# Patient Record
Sex: MALE | Race: WHITE | Employment: FULL TIME | URBAN - METROPOLITAN AREA
[De-identification: names, ages, dates, MRNs, and addresses within clinical notes are randomized per-mention and may not be internally consistent; named-entity substitution may affect disease eponyms.]

---

## 2020-11-16 ENCOUNTER — APPOINTMENT (OUTPATIENT)
Dept: GENERAL RADIOLOGY | Age: 30
End: 2020-11-16
Payer: COMMERCIAL

## 2020-11-16 ENCOUNTER — NURSE TRIAGE (OUTPATIENT)
Dept: OTHER | Facility: CLINIC | Age: 30
End: 2020-11-16

## 2020-11-16 ENCOUNTER — HOSPITAL ENCOUNTER (EMERGENCY)
Age: 30
Discharge: HOME | End: 2020-11-16
Attending: EMERGENCY MEDICINE
Payer: COMMERCIAL

## 2020-11-16 VITALS
OXYGEN SATURATION: 100 % | SYSTOLIC BLOOD PRESSURE: 142 MMHG | WEIGHT: 158 LBS | TEMPERATURE: 98.1 F | HEART RATE: 76 BPM | RESPIRATION RATE: 14 BRPM | HEIGHT: 71 IN | DIASTOLIC BLOOD PRESSURE: 79 MMHG | BODY MASS INDEX: 22.12 KG/M2

## 2020-11-16 LAB
A/G RATIO: 2 (ref 1.1–2.2)
ALBUMIN SERPL-MCNC: 4.8 G/DL (ref 3.4–5)
ALP BLD-CCNC: 71 U/L (ref 40–129)
ALT SERPL-CCNC: 21 U/L (ref 10–40)
ANION GAP SERPL CALCULATED.3IONS-SCNC: 10 MMOL/L (ref 3–16)
AST SERPL-CCNC: 22 U/L (ref 15–37)
BASOPHILS ABSOLUTE: 0 K/UL (ref 0–0.2)
BASOPHILS RELATIVE PERCENT: 0.4 %
BILIRUB SERPL-MCNC: 0.6 MG/DL (ref 0–1)
BUN BLDV-MCNC: 20 MG/DL (ref 7–20)
CALCIUM SERPL-MCNC: 9.7 MG/DL (ref 8.3–10.6)
CHLORIDE BLD-SCNC: 103 MMOL/L (ref 99–110)
CO2: 25 MMOL/L (ref 21–32)
CREAT SERPL-MCNC: 0.6 MG/DL (ref 0.9–1.3)
EOSINOPHILS ABSOLUTE: 0.2 K/UL (ref 0–0.6)
EOSINOPHILS RELATIVE PERCENT: 1.6 %
GFR AFRICAN AMERICAN: >60
GFR NON-AFRICAN AMERICAN: >60
GLOBULIN: 2.4 G/DL
GLUCOSE BLD-MCNC: 88 MG/DL (ref 70–99)
HCT VFR BLD CALC: 45 % (ref 40.5–52.5)
HEMOGLOBIN: 14.7 G/DL (ref 13.5–17.5)
LYMPHOCYTES ABSOLUTE: 3.9 K/UL (ref 1–5.1)
LYMPHOCYTES RELATIVE PERCENT: 35.2 %
MCH RBC QN AUTO: 29.5 PG (ref 26–34)
MCHC RBC AUTO-ENTMCNC: 32.7 G/DL (ref 31–36)
MCV RBC AUTO: 90.3 FL (ref 80–100)
MONOCYTES ABSOLUTE: 1 K/UL (ref 0–1.3)
MONOCYTES RELATIVE PERCENT: 9.5 %
NEUTROPHILS ABSOLUTE: 5.8 K/UL (ref 1.7–7.7)
NEUTROPHILS RELATIVE PERCENT: 53.3 %
PDW BLD-RTO: 13.4 % (ref 12.4–15.4)
PLATELET # BLD: 235 K/UL (ref 135–450)
PMV BLD AUTO: 9 FL (ref 5–10.5)
POTASSIUM SERPL-SCNC: 4.7 MMOL/L (ref 3.5–5.1)
RBC # BLD: 4.99 M/UL (ref 4.2–5.9)
SODIUM BLD-SCNC: 138 MMOL/L (ref 136–145)
TOTAL PROTEIN: 7.2 G/DL (ref 6.4–8.2)
TROPONIN: <0.01 NG/ML
WBC # BLD: 10.9 K/UL (ref 4–11)

## 2020-11-16 PROCEDURE — 85025 COMPLETE CBC W/AUTO DIFF WBC: CPT

## 2020-11-16 PROCEDURE — 99283 EMERGENCY DEPT VISIT LOW MDM: CPT

## 2020-11-16 PROCEDURE — 84484 ASSAY OF TROPONIN QUANT: CPT

## 2020-11-16 PROCEDURE — 93005 ELECTROCARDIOGRAM TRACING: CPT | Performed by: EMERGENCY MEDICINE

## 2020-11-16 PROCEDURE — 71045 X-RAY EXAM CHEST 1 VIEW: CPT

## 2020-11-16 PROCEDURE — 80053 COMPREHEN METABOLIC PANEL: CPT

## 2020-11-16 PROCEDURE — 6370000000 HC RX 637 (ALT 250 FOR IP): Performed by: NURSE PRACTITIONER

## 2020-11-16 RX ORDER — NITROGLYCERIN 0.4 MG/1
0.4 TABLET SUBLINGUAL EVERY 5 MIN PRN
Status: DISCONTINUED | OUTPATIENT
Start: 2020-11-16 | End: 2020-11-16

## 2020-11-16 RX ADMIN — NITROGLYCERIN 0.4 MG: 0.4 TABLET, ORALLY DISINTEGRATING SUBLINGUAL at 19:40

## 2020-11-16 ASSESSMENT — PAIN DESCRIPTION - PAIN TYPE: TYPE: ACUTE PAIN

## 2020-11-16 ASSESSMENT — PAIN DESCRIPTION - LOCATION: LOCATION: CHEST

## 2020-11-16 ASSESSMENT — HEART SCORE: ECG: 1

## 2020-11-16 ASSESSMENT — PAIN SCALES - GENERAL: PAINLEVEL_OUTOF10: 2

## 2020-11-16 NOTE — TELEPHONE ENCOUNTER
Reason for Disposition   Extra heart beats OR irregular heart beating   (i.e., \"palpitations\")    Answer Assessment - Initial Assessment Questions  1. RESPIRATORY STATUS: \"Describe your breathing? \" (e.g., wheezing, shortness of breath, unable to speak, severe coughing)       Short of breath worse with caffeine or when at work     2. ONSET: \"When did this breathing problem begin? \"       Eight days    3. PATTERN \"Does the difficult breathing come and go, or has it been constant since it started? \"       Comes and goes    4. SEVERITY: \"How bad is your breathing? \" (e.g., mild, moderate, severe)     - MILD: No SOB at rest, mild SOB with walking, speaks normally in sentences, can lay down, no retractions, pulse < 100.     - MODERATE: SOB at rest, SOB with minimal exertion and prefers to sit, cannot lie down flat, speaks in phrases, mild retractions, audible wheezing, pulse 100-120.     - SEVERE: Very SOB at rest, speaks in single words, struggling to breathe, sitting hunched forward, retractions, pulse > 120       Mild    5. RECURRENT SYMPTOM: \"Have you had difficulty breathing before? \" If so, ask: \"When was the last time? \" and \"What happened that time? \"       No    6. CARDIAC HISTORY: \"Do you have any history of heart disease? \" (e.g., heart attack, angina, bypass surgery, angioplasty)       No    7. LUNG HISTORY: \"Do you have any history of lung disease? \"  (e.g., pulmonary embolus, asthma, emphysema)      No    8. CAUSE: \"What do you think is causing the breathing problem? \"       Not sure low iron or pt felt may be keto diet and electrolytes    9. OTHER SYMPTOMS: \"Do you have any other symptoms? (e.g., dizziness, runny nose, cough, chest pain, fever)      Dizziness, chest pressure, heart pounding    10. PREGNANCY: \"Is there any chance you are pregnant? \" \"When was your last menstrual period? \"        N/a     11. TRAVEL: \"Have you traveled out of the country in the last month? \" (e.g., travel history, exposures) n/a    Protocols used: BREATHING DIFFICULTY-ADULT-OH    Pt c/o rapid heart rate, sob, chest discomfort and pressure pt to ED now    Attention Provider: Thank you for allowing me to participate in the care of your patient. The patient was connected to triage in response to information provided to the ECC. Please do not respond through this encounter as the response is not directed to a shared pool.

## 2020-11-17 LAB
EKG ATRIAL RATE: 91 BPM
EKG DIAGNOSIS: NORMAL
EKG P AXIS: 70 DEGREES
EKG P-R INTERVAL: 116 MS
EKG Q-T INTERVAL: 350 MS
EKG QRS DURATION: 92 MS
EKG QTC CALCULATION (BAZETT): 430 MS
EKG R AXIS: 65 DEGREES
EKG T AXIS: 44 DEGREES
EKG VENTRICULAR RATE: 91 BPM

## 2020-11-17 PROCEDURE — 93010 ELECTROCARDIOGRAM REPORT: CPT | Performed by: INTERNAL MEDICINE

## 2020-11-17 NOTE — PROGRESS NOTES
[address] [facility]   Cardiovascular Evaluation    PATIENT: Mariely Ramirez  DATE: 2020  MRN: <[id number]>  CSN: [id number]  : 1990      Primary Care Doctor: No primary care provider on file. Reason for evaluation:   Follow-Up from Hospital (2020) and Chest Pain      Subjective:   History of present illness on initial date of evaluation:  Mariely Ramirez is a 38-year-old male with no significant past medical history referred for further evaluation of chest pain, palpitations, and lightheadedness. The patient reports that 2 weeks ago, he became lightheaded while driving and says that his fingers went numb. Since then, he has had recurrent episodes of lightheadedness, typically when standing but has never passed out. During some of these episodes, he feels like his heart is racing. He has also been having chest pressure that seems to come on after drinking caffeine or vaping. Sometimes he will notice the chest pressure and palpitations when exerting himself. When this happens, he sits to calm himself and the pain will usually go away after 10 minutes. He was seen in the ED for these symptoms on 20. At that time, troponin was negative and ECG showed non-specific ST-T changes. CXR showed clear lung fields without any other significant abnormalities. Serum electrolytes were within normal limits and he was ultimately discharged home with instructions to increase his PO fluid intake and follow-up with cardiology. Additionally, the patient states that he has been feeling fatigued lately and has been careful about over-exerting himself because he is worried about his heart racing. He had been running about 5 miles per day, but quit doing this a couple of weeks ago due to his symptoms. He says that he works 90 hours per week and has been under a lot of stress lately trying to cope with the death of a friend. His father was also recently injured.   He had been on a keto diet and had lost 56 lbs. He is no longer doing this and is trying to eat a more well balanced diet. He used to smoke one pack of cigarettes per day but quit 5 years ago and started vaping. He also smokes marijuana 2-3 times per week. Only drinks alcohol on occasion. Family history is notable for heart failure in his father and coronary artery disease in his paternal grandmother who had 4 stents in her 76s. His paternal gradfather also had heart disease, but he is not sure of his diagnoses. There is no problem list on file for this patient. Past Medical History:   has no past medical history on file. Surgical History:   has no past surgical history on file. Social History:   reports that he has quit smoking. He smoked 1.00 pack per day. He has never used smokeless tobacco. He reports current alcohol use. He reports that he does not use drugs. Rarely drinks alcohol. Smokes marijuana 2-3 times a week. Quit smoking 5 years ago and started vaping. Family History:  Father with enlarged heart. Grandmother had CAD with 4 stents in her 66's. Grandfather in his 63's developed cardiac disease (unsure of specific diagnoses.)    Home Medications:  Reviewed and are listed in nursing record. and/or listed below  No current outpatient medications on file. No current facility-administered medications for this visit. Allergies:  Patient has no known allergies. Review of Systems:   A 14 point review of symptoms completed. Pertinent positives identified in the HPI, all other review of symptoms negative as below. Review of Systems   Constitutional: Negative for chills and fever. HENT: Negative for congestion, rhinorrhea and sore throat. Eyes: Negative for photophobia, pain and visual disturbance. Respiratory: Negative for cough and shortness of breath. Cardiovascular: Positive for chest pain and palpitations. Negative for leg swelling.    Gastrointestinal: Negative for abdominal pain, blood in stool, constipation, diarrhea, nausea and vomiting. Endocrine: Negative for cold intolerance and heat intolerance. Genitourinary: Negative for difficulty urinating, dysuria and hematuria. Musculoskeletal: Negative for arthralgias, joint swelling and myalgias. Skin: Negative for rash and wound. Allergic/Immunologic: Negative for environmental allergies and food allergies. Neurological: Positive for dizziness and light-headedness. Negative for syncope. Hematological: Negative for adenopathy. Does not bruise/bleed easily. Psychiatric/Behavioral: Negative for dysphoric mood. The patient is nervous/anxious. Objective:   PHYSICAL EXAM:    Vitals:    11/18/20 1241   BP: (!) 144/82   Pulse: 92   SpO2:     Weight: 163 lb (73.9 kg)     Wt Readings from Last 3 Encounters:   11/18/20 163 lb (73.9 kg)   11/16/20 158 lb (71.7 kg)   01/11/16 210 lb (95.3 kg)     Orthostatics:  Lying:   /78 HR 77  Sitting:  /86 HR 96  Standing:  /82 HR 92    General: Adult male in no acute distress. Pleasant and interactive on exam.  HEENT: Normocephalic, atraumatic, non-icteric, hearing intact, nares normal, mucous membranes moist.  Neck: Supple, trachea midline. No adenopathy. No thyromegaly. No carotid bruits. No JVD. Heart: Regular rate and rhythm. Normal S1 and S2. No murmurs, gallops or rubs. Lungs: Normal respiratory effort. Clear to auscultation bilaterally. No wheezes, rales, or rhonchi. Abdomen: Soft, non-tender. Normoactive bowel sounds. No masses or organomegaly. Skin: No rashes, wounds, or lesions. Pulses: 2+ and symmetric. Extremities: No clubbing, cyanosis, or edema. Musculoskeletal: 5/5 strength in upper and lower extremities. Psych: Normal mood and affect. Neuro: Alert and oriented to person, place, and time. No focal deficits noted.       LABS   CBC:      Lab Results   Component Value Date    WBC 10.9 11/16/2020    RBC 4.99 11/16/2020    HGB 14.7 11/16/2020    HCT 45.0 11/16/2020    MCV 90.3 11/16/2020    RDW 13.4 11/16/2020     11/16/2020     CMP:  Lab Results   Component Value Date     11/16/2020    K 4.7 11/16/2020     11/16/2020    CO2 25 11/16/2020    BUN 20 11/16/2020    CREATININE 0.6 11/16/2020    GFRAA >60 11/16/2020    AGRATIO 2.0 11/16/2020    LABGLOM >60 11/16/2020    GLUCOSE 88 11/16/2020    PROT 7.2 11/16/2020    CALCIUM 9.7 11/16/2020    BILITOT 0.6 11/16/2020    ALKPHOS 71 11/16/2020    AST 22 11/16/2020    ALT 21 11/16/2020     PT/INR:   No results found for: PTINR  Liver:  No components found for: CHLPL  Lab Results   Component Value Date    ALT 21 11/16/2020    AST 22 11/16/2020    ALKPHOS 71 11/16/2020    BILITOT 0.6 11/16/2020     No results found for: LABA1C  Lipids:       No results found for: TRIG       No results found for: HDL       No results found for: LDLCALC       No results found for: LABVLDL      CARDIAC DATA   EKG 11/16/2020:  Normal sinus rhythm, non-specific ST-T wave abnormality. ECHO: N/A    STRESS TEST: N/A    CARDIAC CATH: N/A    VASCULAR/OTHER IMAGING:  Chest X-Ray 11/16/2020:  No acute cardiopulmonary process. Assessment:     1. Atypical chest pain - Will obtain stress echocardiogram to assess cardiac structure and function and evaluate for ischemia. 2. Lightheadedness - May be secondary to panic attacks and/or vasovagal reaction versus orthostatic hypotension (had previously been following strict keto diet and lost 56 lbs). Orthostatic vitals were negative in clinic today. Denies syncope. 3. Palpitations - Arrange for 2-week cardiac event monitor to evaluate for tachyarrhythmias. Electrolytes WNL on recent labs. No recent TSH on record. 4. Mildly elevated BP  5. H/o tobacco use  6. Marijuana use    Plan:     1. Ordered exercise stress echocardiogram to assess cardiac structure and function and evaluate for ischemia.   2. Arrange for 2-week cardiac event monitor to evaluate for possible tachyarrhythmias. 3. Check TSH. 4. Encouraged patient to limit use of caffeine and recommended that he quit vaping. 5. Recommended well balanced diet and adequate PO fluid intake. 6. Advised to always rise slowly from lying and sitting positions and to sit or lie down immediately whenever he gets lightheaded. 7. BP mildly elevated at 144/82 in clinic today. Will monitor and consider starting on antihypertensive therapy if elevated on repeat readings. 8. Follow-up in one month. This note was scribed in the presence of Amadou Dunham DO by Munira Stephens RN. It is a pleasure to assist in the care of Yari Sommers. Please call with any questions. The scribes documentation has been prepared under my direction and personally reviewed by me in its entirety. I confirm that the note above accurately reflects all work, treatment, procedures, and medical decision making performed by me. I, Dr. Lul Rogers, personally performed the services described in this documentation as scribed by Munira Stephens RN in my presence, and it is both accurate and complete to the best of our ability.          Lul Rogers, [address] [facility]  [phone] [facility]  [phone] [facility]

## 2020-11-17 NOTE — ED PROVIDER NOTES
I personally evaluated and examined the patient in conjunction with the ARDHIKA and agree with the assessment, treatment plan and disposition of the patient as recorded by the RADHIKA. I reviewed pertinent nursing notes, triage notes, vital signs, past medical history, family and social history, medications, and allergies. Complete review of systems was conducted by the RADHIKA and/or myself. Review of systems is negative except as documented in the history of present illness. Brief HPI: 27-year-old male presents emergency department chief complaint of a week and a half of chest discomfort lightheadedness and palpitations. Feels lightheadedness when he stands up. Also intermittently develops palpitations with associated chest discomfort described as a pressure lasting a couple of seconds. Usually occurs with exertion. No family history of sudden cardiac death. 3/10. No radiation. No associated shortness of breath. Denies past history of DVT or PE, denies hormone therapy use, denies recent surgery, denies recent travel. No calf pain or lower extremity swelling. Of note, patient recently lost a lot of weight secondary to keto diet, exercise and intermittent fasting. Denies any new supplements but reports also sometimes gets chest pain and palpitations with caffeine intake. No family history of sudden cardiac death. Denies any cocaine use. Physical Exam: General: Patient is in no acute distress   Head: Normocephalic, atraumatic, pupils are equal and reactive to light. EOMI. Neck: Neck is supple. No JVD noted. Cardiovascular: Capillary refill less than 2 seconds  Lungs: No respiratory distress  Abdomen: soft, non-tender, non-distended   Extremities: no lower extremity edema. Capillary refill is less than 2 seconds   Skin: no cyanosis or pallor; no rashes noted   Neuro: CN's 2-12 are grossly intact. No focal neurologic deficit appreciated. Cardiac work-up initiated.   PERC score is negative therefore further work-up not indicated. Heart score is low risk. Delta troponin not indicated as symptom onset is greater than 6 hours. Heart score is a 1. Patient is currently asymptomatic. Given the above symptoms recommend follow-up with cardiology-consider Holter monitor. However, currently patient is asymptomatic young and otherwise healthy with a low risk heart score. Therefore recommend discharge home with strict return precautions. Patient is also educated to increase fluid intake, avoid intermittent fasting and avoid heavy exertion until following up with cardiology. Melchor Carrillo is unremarkable here. HEART Score ? 3 and 1 negative troponin - No hospitalization indicated    I completed a HEART Score to screen for Major Adverse Cardiac Event (MACE) in this patient. The evidence indicates that the patient is very low risk for MACE and this is consistent with my clinical intuition. The risk of further workup or hospitalization for MACE is likely higher than the risk of the patient having a MACE. It is, therefore, in the patients best interest not to do additional emergent testing or to be hospitalized for MACE. Patient instructed to follow up with their primary care doctor in one-two days and return to the emergency department if worsening of the condition or any other concerns. Please see discharge instructions for further delineation regarding the specific discharge instructions explained and given to the patient. EKG: EKG interpretation by ED physician: Normal axis, normal sinus rhythm at a rate of 91 bpm. No ischemic ST changes. Avoid heavy exertion    FINAL IMPRESSION     1. Chest pain, unspecified type    2. Lightheaded    3. Palpitations            Electronically signed by:   Sharon Howard DO  11/16/20 2007

## 2020-11-18 ENCOUNTER — OFFICE VISIT (OUTPATIENT)
Dept: CARDIOLOGY CLINIC | Age: 30
End: 2020-11-18
Payer: COMMERCIAL

## 2020-11-18 ENCOUNTER — TELEPHONE (OUTPATIENT)
Dept: CARDIOLOGY CLINIC | Age: 30
End: 2020-11-18

## 2020-11-18 VITALS
OXYGEN SATURATION: 98 % | SYSTOLIC BLOOD PRESSURE: 144 MMHG | WEIGHT: 163 LBS | BODY MASS INDEX: 22.82 KG/M2 | HEIGHT: 71 IN | DIASTOLIC BLOOD PRESSURE: 82 MMHG | HEART RATE: 92 BPM

## 2020-11-18 PROCEDURE — 99203 OFFICE O/P NEW LOW 30 MIN: CPT | Performed by: INTERNAL MEDICINE

## 2020-11-21 PROBLEM — Z87.891 FORMER TOBACCO USE: Status: ACTIVE | Noted: 2020-11-21

## 2020-11-21 ASSESSMENT — ENCOUNTER SYMPTOMS
SHORTNESS OF BREATH: 0
VOMITING: 0
PHOTOPHOBIA: 0
DIARRHEA: 0
SORE THROAT: 0
RHINORRHEA: 0
COUGH: 0
EYE PAIN: 0
BLOOD IN STOOL: 0
ABDOMINAL PAIN: 0
NAUSEA: 0
CONSTIPATION: 0

## 2020-11-30 ENCOUNTER — TELEPHONE (OUTPATIENT)
Dept: EMERGENCY DEPT | Age: 30
End: 2020-11-30

## 2020-11-30 NOTE — TELEPHONE ENCOUNTER
[facility] ([facility]) reached out to patient to complete brief wellness check. However, the patient did not answer the phone. [facility] will try to reach out to patient again.

## 2020-12-02 PROCEDURE — 93268 ECG RECORD/REVIEW: CPT | Performed by: INTERNAL MEDICINE

## 2020-12-03 ENCOUNTER — TELEPHONE (OUTPATIENT)
Dept: CARDIOLOGY CLINIC | Age: 30
End: 2020-12-03

## 2020-12-11 NOTE — TELEPHONE ENCOUNTER
Recent 2-week cardiac event monitor shows pre-dominant sinus rhythm with rare PVCs, rare PACs, a 4-beat run of NSVT, and a 3-beat run of PSVT that was likely atrial tachycardia. During the time that the patient reported symptoms, he was in sinus rhythm. No additional changes indicated at this time. He has follow-up with me on 12/14/20.

## 2020-12-14 ENCOUNTER — OFFICE VISIT (OUTPATIENT)
Dept: CARDIOLOGY CLINIC | Age: 30
End: 2020-12-14
Payer: COMMERCIAL

## 2020-12-14 VITALS
SYSTOLIC BLOOD PRESSURE: 132 MMHG | DIASTOLIC BLOOD PRESSURE: 80 MMHG | OXYGEN SATURATION: 99 % | BODY MASS INDEX: 23.85 KG/M2 | WEIGHT: 171 LBS | HEART RATE: 88 BPM

## 2020-12-14 PROBLEM — R06.02 SHORTNESS OF BREATH: Status: ACTIVE | Noted: 2020-12-14

## 2020-12-14 PROCEDURE — G8427 DOCREV CUR MEDS BY ELIG CLIN: HCPCS | Performed by: INTERNAL MEDICINE

## 2020-12-14 PROCEDURE — 1036F TOBACCO NON-USER: CPT | Performed by: INTERNAL MEDICINE

## 2020-12-14 PROCEDURE — 99214 OFFICE O/P EST MOD 30 MIN: CPT | Performed by: INTERNAL MEDICINE

## 2020-12-14 PROCEDURE — G8484 FLU IMMUNIZE NO ADMIN: HCPCS | Performed by: INTERNAL MEDICINE

## 2020-12-14 PROCEDURE — G8420 CALC BMI NORM PARAMETERS: HCPCS | Performed by: INTERNAL MEDICINE

## 2020-12-14 ASSESSMENT — ENCOUNTER SYMPTOMS
VOMITING: 0
EYE PAIN: 0
PHOTOPHOBIA: 0
ABDOMINAL PAIN: 0
NAUSEA: 0
SORE THROAT: 0
COUGH: 0
RHINORRHEA: 0
DIARRHEA: 0
BLOOD IN STOOL: 0
CONSTIPATION: 0

## 2020-12-14 NOTE — PATIENT INSTRUCTIONS
1. If your symptoms change, call us to discuss testing. 2. Discussed the importance of quitting vaping. 3. Continue with physical activity. 4. Establish care with a primary care physician. 5. Check TSH - will hold off for now. 6. Follow up with me as needed.

## 2020-12-14 NOTE — PROGRESS NOTES
[address] [facility]   Cardiovascular Follow up    PATIENT: Toan Nash  DATE: 2020  MRN: <[id number]>  CSN: [id number]  : 1990      Primary Care Doctor: No primary care provider on file. Reason for visit:   Chest pain, palpitations, lightheadedness    Subjective:   History of present illness on initial date of evaluation:  Toan Nash is a 64-year-old male initiialy referred for further evaluation of chest pain, palpitations, and lightheadedness. The patient reported that he had an episode of lightheadedness while driving and says that his fingers went numb. Since then, he has had recurrent episodes of lightheadedness, typically when standing but has never passed out. During some of these episodes, he feels like his heart is racing. He has also been having chest pressure that seems to come on after drinking caffeine or vaping. Sometimes he will notice the chest pressure and palpitations when exerting himself. When this happens, he sits to calm himself and the pain will usually go away after 10 minutes. He was seen in the ED for these symptoms on 20. At that time, troponin was negative and ECG showed non-specific ST-T changes. CXR showed clear lung fields without any other significant abnormalities. Serum electrolytes were within normal limits and he was ultimately discharged home with instructions to increase his PO fluid intake and follow-up with cardiology. Additionally, the patient states that he has been feeling fatigued lately and has been careful about over-exerting himself because he is worried about his heart racing. He had been running about 5 miles per day, but quit doing this a couple of weeks ago due to his symptoms.       Follow-up Visit 20:  Since his last visit on 20, the patient completed a 2-week cardiac event monitor that showed predominant sinus rhythm with rare PVCs and PACs, one 4-beat run of SVT that appeared to be atrial tachycardia, and one 4-beat run of NSVT. He was in sinus rhythm during episodes where he had reported symptoms. He did not get the exercise stress echocardiogram that was ordered for further evaluation of his atypical chest pain and shortness of breath because he was going to have a very high co-pay. Overall, he believes that his symptoms are much improved from his previous evaluation. His chest pain seems to have resolved. He is back to exercising regularly and is tolerating this well. He lays hardwood mirna for a living. His lightheadedness and palpitations seem to be less frequent and he believes that they are associated with stress and anxiety. He also notes some occasional shortness of breat. He continues to vape. Patient Active Problem List   Diagnosis    Former tobacco use    Shortness of breath     Past Medical History:   has no past medical history on file. Surgical History:   has no past surgical history on file. Social History:   reports that he has quit smoking. He smoked 1.00 pack per day. He has never used smokeless tobacco. He reports current alcohol use. He reports that he does not use drugs. Rarely drinks alcohol. Smokes marijuana 2-3 times a week. Quit smoking 5 years ago and started vaping. Family History:  Father with enlarged heart. Grandmother had CAD with 4 stents in her 66's. Grandfather in his 63's developed cardiac disease (unsure of specific diagnoses.)    Home Medications:  Reviewed and are listed in nursing record. and/or listed below  No current outpatient medications on file. No current facility-administered medications for this visit. Allergies:  Patient has no known allergies. Review of Systems:   A 14 point review of symptoms completed. Pertinent positives identified in the HPI, all other review of symptoms negative as below. Review of Systems   Constitutional: Negative for chills and fever.    HENT: Negative for congestion, rhinorrhea and sore throat. Eyes: Negative for photophobia, pain and visual disturbance. Respiratory: Positive for shortness of breath. Negative for cough. Cardiovascular: Positive for palpitations. Negative for chest pain and leg swelling. Gastrointestinal: Negative for abdominal pain, blood in stool, constipation, diarrhea, nausea and vomiting. Endocrine: Negative for cold intolerance and heat intolerance. Genitourinary: Negative for difficulty urinating, dysuria and hematuria. Musculoskeletal: Negative for arthralgias, joint swelling and myalgias. Skin: Negative for rash and wound. Allergic/Immunologic: Negative for environmental allergies and food allergies. Neurological: Positive for dizziness and light-headedness. Negative for syncope. Hematological: Negative for adenopathy. Does not bruise/bleed easily. Psychiatric/Behavioral: Negative for dysphoric mood. The patient is nervous/anxious. Objective:   PHYSICAL EXAM:    Vitals:    12/14/20 1330   BP: 132/80   Pulse: 88   SpO2: 99%    Weight: 171 lb (77.6 kg)     Wt Readings from Last 3 Encounters:   12/14/20 171 lb (77.6 kg)   11/18/20 163 lb (73.9 kg)   11/16/20 158 lb (71.7 kg)     General: Adult male in no acute distress. Pleasant and interactive on exam.  HEENT: Normocephalic, atraumatic, non-icteric, hearing intact, nares normal, mucous membranes moist.  Neck: No JVD. Heart: Regular rate and rhythm. Normal S1 and S2. No murmurs, gallops or rubs. Lungs: Normal respiratory effort. Clear to auscultation bilaterally. No wheezes, rales, or rhonchi. Abdomen: Soft, non-tender. Normoactive bowel sounds. No masses or organomegaly. Skin: No rashes, wounds, or lesions. Pulses: 2+ and symmetric. Extremities: No clubbing, cyanosis, or edema. Musculoskeletal: 5/5 strength in upper and lower extremities. Psych: Normal mood and affect. Neuro: Alert and oriented to person, place, and time. No focal deficits noted.       LABS   CBC:      Lab Results Component Value Date    WBC 10.9 11/16/2020    RBC 4.99 11/16/2020    HGB 14.7 11/16/2020    HCT 45.0 11/16/2020    MCV 90.3 11/16/2020    RDW 13.4 11/16/2020     11/16/2020     CMP:  Lab Results   Component Value Date     11/16/2020    K 4.7 11/16/2020     11/16/2020    CO2 25 11/16/2020    BUN 20 11/16/2020    CREATININE 0.6 11/16/2020    GFRAA >60 11/16/2020    AGRATIO 2.0 11/16/2020    LABGLOM >60 11/16/2020    GLUCOSE 88 11/16/2020    PROT 7.2 11/16/2020    CALCIUM 9.7 11/16/2020    BILITOT 0.6 11/16/2020    ALKPHOS 71 11/16/2020    AST 22 11/16/2020    ALT 21 11/16/2020     PT/INR:   No results found for: PTINR  Liver:  No components found for: CHLPL  Lab Results   Component Value Date    ALT 21 11/16/2020    AST 22 11/16/2020    ALKPHOS 71 11/16/2020    BILITOT 0.6 11/16/2020     No results found for: LABA1C  Lipids:       No results found for: TRIG       No results found for: HDL       No results found for: LDLCALC       No results found for: LABVLDL      CARDIAC DATA   EKG 11/16/2020:  Normal sinus rhythm, non-specific ST-T wave abnormality. ECHO: N/A    STRESS TEST: N/A    CARDIAC CATH: N/A    2-week cardiac event monitor 11/2020:  Pre-dominant sinus rhythm  Rare PVCs and PACs  One 4-beat run of SVT that appeared to be atrial tachycardia  One 4-beat run of NSVT  Was in sinus rhythm during time of reported symptoms. VASCULAR/OTHER IMAGING:  Chest X-Ray 11/16/2020:  No acute cardiopulmonary process. Assessment:     1. Atypical chest pain - Resolved. Suspect due to muscular/chest wall pain and/or anxiety. Did not get stress echocardiogram ordered during previous visit due to high co-pay. 2. Lightheadedness - Improved. Overall, seems to be mild an non-limiting and worse during times of anxiety and stress. Denies syncope or other high-risk symptoms. Orthostatic vitals were negative during previous testing.   3. Palpitations - Recent 2-week cardiac event monitor demonstrated pre-dominant sinus rhythm rare PVCs and PACs, one 4-beat run of SVT that appeared to be atrial tachycardia, and one 4-beat run of NSVT. Was in sinus rhythm during time of reported symptoms. Electrolytes WNL. 4. Anxiety  5. H/o tobacco use  6. Marijuana use  7. Vape use    Plan:     1. Overall, symptoms seem to be improved and are likely largely related to stress/anxiety. 2. Given relatively low atrial and ventricular ectopy burden on cardiac event monitor, would not recommend additional medical therapy at this time. 3. He did not get the exercise stress echocardiogram that was ordered at his previous visit due to a high co-pay and given that his chest pain has resolved he would prefer to defer testing at this time time, which is not unreasonable. 4. He is in the process of establishing care with a primary care physician for further evaluation and management of his anxiety and I offered my assistance in this process if needed. 5. Reviewed risks of vaping and strongly encouraged cessation. 6. Continued to encourage regular physical exercise for at least 30 minutes 3-5 times per week and explained that physical activity can be an excellent means of helping to reduce stress and anxiety. 7. Also recommended checking TSH, but patient would prefer to hold off on additional lab work for now. 8. Follow-up as needed. This note was scribed in the presence of Amadou Dunham DO by Jami Ruvalcaba RN. It is a pleasure to assist in the care of Johnie Doan. Please call with any questions. The scribes documentation has been prepared under my direction and personally reviewed by me in its entirety. I confirm that the note above accurately reflects all work, treatment, procedures, and medical decision making performed by me.   I, Dr. Guzman Alford, personally performed the services described in this documentation as scribed by Jami Ruvalcaba RN in my presence, and it is both accurate and complete to the best of our gagandeep Rogers, [facility]  [phone] [facility]  [phone] [address] [place] [address]

## 2020-12-19 ASSESSMENT — ENCOUNTER SYMPTOMS: SHORTNESS OF BREATH: 1

## 2024-10-07 ENCOUNTER — OFFICE VISIT (OUTPATIENT)
Age: 34
End: 2024-10-07

## 2024-10-07 VITALS
TEMPERATURE: 97.7 F | DIASTOLIC BLOOD PRESSURE: 84 MMHG | OXYGEN SATURATION: 98 % | HEIGHT: 71 IN | BODY MASS INDEX: 24.4 KG/M2 | SYSTOLIC BLOOD PRESSURE: 136 MMHG | HEART RATE: 76 BPM | WEIGHT: 174.3 LBS

## 2024-10-07 DIAGNOSIS — S05.02XA CORNEA ABRASION, LEFT, INITIAL ENCOUNTER: Primary | ICD-10-CM

## 2024-10-07 RX ORDER — ERYTHROMYCIN 5 MG/G
1 OINTMENT OPHTHALMIC EVERY 6 HOURS
Qty: 3.5 G | Refills: 0 | Status: SHIPPED | OUTPATIENT
Start: 2024-10-07 | End: 2024-10-14

## 2024-10-07 ASSESSMENT — ENCOUNTER SYMPTOMS
EYE DISCHARGE: 0
EYE PAIN: 1
EYE REDNESS: 1
PHOTOPHOBIA: 1
EYE ITCHING: 0

## 2024-10-07 NOTE — PROGRESS NOTES
Kyree Faith (:  1990) is a 33 y.o. male,New patient, here for evaluation of the following chief complaint(s):  Eye Problem (Left eye redness, sensitivity to light, painful and watery starting last Tuesday. )      ASSESSMENT/PLAN:    ICD-10-CM    1. Cornea abrasion, left, initial encounter  S05.02XA erythromycin (ROMYCIN) 5 MG/GM ophthalmic ointment        Patient is experiencing a corneal abrasion to his left eye. No foreign bodies appreciated. This will be treated with Erythromycin. Encouraged him to use the ointment as prescribed, refrain from scratching/rubbing, and wear safety goggles while working. Return for persistent symptoms. ER or [facility] urgent care for worsening symptoms. He is understanding and agreeable to this plan.     Dx Disposition: stye, conjunctivitis, foreign body  Education and handout provided on diagnosis and management of symptoms.   AVS reviewed with patient. Follow up as needed in UC or with PCP for new or worsening symptoms.   Return if symptoms worsen or fail to improve.    SUBJECTIVE/OBJECTIVE:  Patient presents to the clinic with complaints of left eye irritation, light sensitivity, redness, and intermittent feelings of getting something in it. This started Tuesday morning. He states that he has had \"sticky eyes\" in the morning and was rubbing it and felt \"sharp.\" Denies any fever or body aches. He has tried dayquil and ibuprofen with some relief.        History provided by:  Patient   used: No    Eye Problem   Associated symptoms include eye redness and photophobia. Pertinent negatives include no eye discharge, fever or itching.       Vitals:    10/07/24 1240 10/07/24 1252   BP: (!) 147/86 136/84   Site: Right Upper Arm    Position: Sitting    Cuff Size: Medium Adult    Pulse: 76    Temp: 97.7 °F (36.5 °C)    TempSrc: Oral    SpO2: 98%    Weight: 79.1 kg (174 lb 4.8 oz)    Height: 1.791 m (5' 10.5\")        Review of Systems   Constitutional:  Negative for